# Patient Record
Sex: MALE | URBAN - METROPOLITAN AREA
[De-identification: names, ages, dates, MRNs, and addresses within clinical notes are randomized per-mention and may not be internally consistent; named-entity substitution may affect disease eponyms.]

---

## 2023-04-26 ENCOUNTER — TELEPHONE (OUTPATIENT)
Dept: FAMILY MEDICINE | Facility: CLINIC | Age: 48
End: 2023-04-26

## 2023-04-26 NOTE — TELEPHONE ENCOUNTER
"Patient called to [facility] looking for advice on where he should go for medical attention for his symptoms.    Patient is NOT a [facility] patient, is not established with primary care.    Patient notes he has been experiencing headaches x 1 week, with lightheadedness and now is getting ringing in his ears the last 2 days.    No history of headaches, symptoms are new to him.    Headache is in back of head, radiates to behind eyes. Come and go. Feels a \"tightness\" in his eyes.   Stares at computer all day long, works from home.  Rates headaches 2/3 out of 10 on pain scale. Has taken Tylenol at bedtime, falls asleep but seems to wake up with headaches and lightheadedness.  Ringing in ears, both ears, started about 2 nights ago. Still present, not going away, is not worsening.  Has periods of mild lightheadedness throughout the day, notes about 70% of his day is feeling this way. More like a \"woozy\" feeling. Denies feeling faint or like will pass out.  Denies feeling confused or disoriented. Denies changes to speech. Denies weakness or numbness or tingling in face, arm, leg or one side of the body.  Denies nausea, vomiting, recent illness, sinus pain or pressure, cold-like symptoms. Denies fevers, chills. Denies stiff neck or sore throat. Denies pain in ears or any loss of hearing. Denies trouble with vision, blurry vision. Denies spinning with the lightheadedness. Denies shortness of breath, trouble breathing. Denies pain in the eyes or one eye.  Denies any chronic medical conditions, denies taking any prescription medication.    Nothing seems to be triggering symptoms, nothing seems to resolve symptoms. Patient does not have an idea as to what is causing symptoms. Has never really had headaches before, no history of migraines.    Patient did note he did have a fall and hit head about 3-4 months ago and was seen and assessed in an ER at that time, was told was \"okay\" and sent home. Denied being told had " concussion or other head injury.    Writer advised for patient to be seen today and further evaluated in an urgent care. Instructed patient to contact back of insurance card to see what entity can go to for a visit. Since patient is noting having headaches x 1 week, lightheadedness and ringing in ears, advised to be seen today.    Patient agreed with plans.           Faina Zabala RN  Clinical Triage/Primary Care  [facility]